# Patient Record
Sex: MALE | Race: BLACK OR AFRICAN AMERICAN | NOT HISPANIC OR LATINO | URBAN - METROPOLITAN AREA
[De-identification: names, ages, dates, MRNs, and addresses within clinical notes are randomized per-mention and may not be internally consistent; named-entity substitution may affect disease eponyms.]

---
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
## 2019-08-27 ENCOUNTER — OFFICE VISIT - HEALTHEAST (OUTPATIENT)
Dept: FAMILY MEDICINE | Facility: CLINIC | Age: 19
End: 2019-08-27

## 2019-08-27 DIAGNOSIS — Z00.00 WELL ADULT EXAM: ICD-10-CM

## 2019-08-27 ASSESSMENT — MIFFLIN-ST. JEOR: SCORE: 1873.08

## 2019-08-28 ENCOUNTER — COMMUNICATION - HEALTHEAST (OUTPATIENT)
Dept: ADMINISTRATIVE | Facility: CLINIC | Age: 19
End: 2019-08-28

## 2019-08-30 ENCOUNTER — COMMUNICATION - HEALTHEAST (OUTPATIENT)
Dept: FAMILY MEDICINE | Facility: CLINIC | Age: 19
End: 2019-08-30

## 2021-05-30 ENCOUNTER — RECORDS - HEALTHEAST (OUTPATIENT)
Dept: ADMINISTRATIVE | Facility: CLINIC | Age: 21
End: 2021-05-30

## 2021-05-31 NOTE — TELEPHONE ENCOUNTER
Please look for this fax today, I will try but can not guarantee that they will get fax to my by then.

## 2021-05-31 NOTE — TELEPHONE ENCOUNTER
Pt discovered after returning from his physical that he will need a different form completed in order to participate in college sports.  He dropped off the form and is hoping Kristin might be able to complete it and fax it before 1:00 pm on Thurs 8/29/19 when he is scheduled to start practice, if possible.  The fax number is on the top of the form and the form is in the mail cart in Kristin Serrato's file.

## 2021-05-31 NOTE — TELEPHONE ENCOUNTER
Who is calling:  Patient  Reason for Call:  Patient was informed of Kristin Serrato PA-C's message below. Patient stated his practice start at 4 PM today and he would like a call back with an update.  Date of last appointment with primary care: 8/27/19  Okay to leave a detailed message: No  [phone]

## 2021-06-01 VITALS — WEIGHT: 186 LBS | BODY MASS INDEX: 24.54 KG/M2

## 2021-06-01 VITALS — HEIGHT: 72 IN | BODY MASS INDEX: 24.38 KG/M2 | WEIGHT: 180 LBS

## 2021-06-03 VITALS — WEIGHT: 183 LBS | HEIGHT: 72 IN | BODY MASS INDEX: 24.79 KG/M2

## 2021-06-16 PROBLEM — R21 RASH: Status: ACTIVE | Noted: 2018-07-23

## 2021-06-19 NOTE — PROGRESS NOTES
Assessment:    1. Rash- with exercise.Possible exercise induced urticaria. Advised to see allergist 7-23-18        Plan: See Patient Instructions.    No medications were ordered this encounter      Patient Instructions     Take pictures of the skin when he has hives.    Then should meet with one of our allergists.     Cards given.    He should have a physical before going to college in [place].        Roomed by: stephen    Accompanied by Father    Refills needed? No    Do you have any forms that need to be filled out? No        Vitals:    07/23/18 0947   BP: 118/68   Pulse: 69   Temp: 98  F (36.7  C)   SpO2: 98%       Chief Complaint   Patient presents with     breaking out     started last summer all over body, on face worse        HPI:    Whenever plays basketball gets lines on forehead  Deep creases on face    Lasts for 20-30 min and goes away    Skin a little itchy at the time   Not red    Skin on arms gets tiny bumps    Not sure if any on his legs    Left shoulder may be red    Once he stops exercising it all goes away    If he works hard for about 20 min he gets the rash.    ROS:      No swallowing or breathing problems when he has the rash.    Allergies: nuts and peanuts   Has some seasonal allergies and takes Zyrtec occasionally         ================================    Physical Exam:    General Appearance:   Alert, NAD   Eyes: clear    Ears:  Right TM:  clear   Left TM:  clear   Nose: clear    Throat:  clear       Neck:   Supple, No significant adenopathy   Lungs:  clear                Cardiac:   S1, S2 nl

## 2021-06-20 NOTE — PROGRESS NOTES
[facility] Well Child Check    ASSESSMENT & PLAN  Tesfaye Miranda is a 17 y.o. who has normal growth and normal development.      History urticarial type rash with exertion.   Patient was to be evaluated by allergy, no appt scheduled.   Leaves for college today.  Reviewed importance , Epipen renewed , renewed proper use , no evidence hives today.      Patient unsure which immunizations are needed for college.  Imm record printed and sent with patient.      No concerns, denies SA today , although we did review importance condom usage       Diagnoses and all orders for this visit:    WC (well child check)  -     Lipid Profile  -     Hearing Screening  -     Vision Screening    Allergy to peanuts  -     EPINEPHrine (EPIPEN/ADRENACLICK/AUVI-Q) 0.3 mg/0.3 mL injection; Inject 0.3 mL (0.3 mg total) as directed as needed for anaphylaxis. Inject into thigh.  Dispense: 4 Pre-filled Pen Syringe; Refill: 0        schedule with allergy     IMMUNIZATIONS/LABS  No immunizations due today.    REFERRALS  Dental:  Recommend routine dental care as appropriate.  Other:  Referrals were made for allergy follow up     ANTICIPATORY GUIDANCE  Social:  Friends, Peer Pressure, Extracurricular Activities and Changes and Choices  Parenting:  Support, Family Time and Confidential Health Care  Nutrition:  Junk Food and Dieting  Play and Communication:  Organized Sports, Appropriate Use of TV, Read Books and Media Violence Awareness  Health:  Smoking, Alcohol, Self Testicular Exam and Sun Screen  Safety:  Seat Belts, Swimming Safety, Contact Sports, Bike/Motorcycle Helmets and Safe storage of Weapons    HEALTH HISTORY  Do you have any concerns that you'd like to discuss today?: No concerns       No question data found.    Do you have any significant health concerns in your family history?: No  Family History   Problem Relation Age of Onset     Arthritis Mother      No Medical Problems Father      Since your last visit, have there been any  major changes in your family, such as a move, job change, separation, divorce, or death in the family?: No  Has a lack of transportation kept you from medical appointments?: No    Home  Who lives in your home?:  Mother, Father  Social History     Social History Narrative     Do you have any concerns about losing your housing?: No  Is your housing safe and comfortable?: Yes  Do you have any trouble with sleep?:  No    Education  What school do you child attend?:  [employer]  What grade are you in?:  [employer]  How do you perform in school (grades, behavior, attention, homework?: Good     Eating  Do you eat regular meals including fruits and vegetables?:  yes  What are you drinking (cow's milk, water, soda, juice, sports drinks, energy drinks, etc)?: water  Have you been worried that you don't have enough food?: No  Do you have concerns about your body or appearance?:  No    Activities  Do you have friends?:  yes  Do you get at least one hour of physical activity per day?:  yes  How many hours a day are you in front of a screen other than for schoolwork (computer, TV, phone)?:  4  What do you do for exercise?:  Basketball  Do you have interest/participate in community activities/volunteers/school sports?:  yes    MENTAL HEALTH SCREENING  PHQ-2 Total Score: 0 (8/23/2018  7:58 AM)  Depression Follow-up Plan: patient follow-up to return when and if necessary (8/23/2018  7:58 AM)  PHQ-9 Total Score: 0 (8/23/2018  7:58 AM)    VISION/HEARING  Vision: Completed. See Results  Hearing:  Completed. See Results     Hearing Screening    125Hz 250Hz 500Hz 1000Hz 2000Hz 3000Hz 4000Hz 6000Hz 8000Hz   Right ear:   35 20 20 20 20 20    Left ear:   30 20 20 20 20 20        TB Risk Assessment:  The patient and/or parent/guardian answer positive to:  patient and/or parent/guardian answer 'no' to all screening TB questions    Dyslipidemia Risk Screening  Have either of your parents or any of your grandparents had a stroke or heart  attack before age 55?: No  Any parents with high cholesterol or currently taking medications to treat?: No     Dental  When was the last time you saw the dentist?: over 12 months ago   Parent/Guardian declines the fluoride varnish application today. Fluoride not applied today.    Patient Active Problem List   Diagnosis     Allergy To Peanuts     Rash- with exercise.Possible exercise induced urticaria. Advised to see allergist 17       Drugs  Does the patient use tobacco/alcohol/drugs?:  no    Safety  Does the patient have any safety concerns (peer or home)?:  no  Does the patient use safety belts, helmets and other safety equipment?:  no    Sex  Have you ever had sex?:  No    MEASUREMENTS  Height:  6' (1.829 m)  Weight: 180 lb (81.6 kg)  BMI: Body mass index is 24.41 kg/(m^2).  Blood Pressure: 118/80  Blood pressure percentiles are 35 % systolic and 83 % diastolic based on the 2017 AAP Clinical Practice Guideline. Blood pressure percentile targets: 90: 136/83, 95: 140/87, 95 + 12 mmH/99. This reading is in the Stage 1 hypertension range (BP >= 130/80).    PHYSICAL EXAM  Vitals: /80 (Patient Site: Right Arm, Patient Position: Sitting, Cuff Size: Adult Regular)  Ht 6' (1.829 m)  Wt 180 lb (81.6 kg)  BMI 24.41 kg/m2  General: Alert, quiet, in no acute distress  Head: Normocephalic/atraumatic   Eyes: PERRL, EOM intact, red reflex present bilaterally  Ears: Ears normally formed and placed, canals patent  Nose: Patent nares; noncongested  Mouth: Pink moist mucous membranes, tonsils plus 2, oropharynx clear without erythema   Neck: Supple, no anomalies  Lungs: Clear to auscultation bilaterally.   CV: Normal S1 & S2 with regular rate and rhythm, no murmur present   Abd: Soft, nontender, nondistended, no masses or hepatosplenomegaly, no rebound or guarding  Back: Spine straight, nontender  : Jose 5, testes descended bilaterally, no swelling or lumps noted, normal male genitalia, no evidence  hernia    MSK: Duck walk without concern, hops and skips without issue   Skin: No rashes or lesions; no jaundice  Neuro:  Normal tone, symmetric reflexes

## 2021-06-27 NOTE — PROGRESS NOTES
Progress Notes by Kristin Serrato PA-C at 8/27/2019  2:40 PM     Author: Kristin Serrato PA-C Service: -- Author Type: Physician Assistant    Filed: 8/27/2019  3:35 PM Encounter Date: 8/27/2019 Status: Signed    : Kristin Serrato PA-C (Physician Assistant)       MALE PREVENTATIVE EXAM    Assessment and Plan:       1. Well adult exam  Advised health diet, 6-8 fruit and vegetables daily and discussed juicing in am with protein powder for breakfast.  Advised regular exercise, discussed regular aerobic exercise and strength training.  Advised not more than 1 alcoholic drinks in any given day.  Caffeine: not more than 2 daily.  Water: 6-8 glasses daily.  Multivitamin with Vitamin D 2000 units and iron for women.       Next follow up:  Return in about 1 year (around 8/27/2020) for With PCP, Recheck.    Immunization Review  Adult Imm Review: Due today, orders placed  non-smoker    I discussed the following with the patient:   Adult Healthy Living: Importance of regular exercise  Healthy nutrition  Supplement use        Subjective:   Chief Complaint: Tesfaye Miranda is an 18 y.o. male here for a preventative health visit.     HPI:  Tesfaye Miranda is seen for a sports physical for basketball in college. He had 3 concussions one in football and two in basket ball, no LOC, no ongoing headaches.     Healthy Habits  Are you taking a daily aspirin? No  Do you typically exercising at least 40 min, 3-4 times per week?  Yes  Do you usually eat at least 4 servings of fruit and vegetables a day, include whole grains and fiber and avoid regularly eating high fat foods? Yes  Have you had an eye exam in the past two years? Yes  Do you see a dentist twice per year? NO  Do you have any concerns regarding sleep? No    Safety Screen  If you own firearms, are they secured in a locked gun cabinet or with trigger locks? The patient does not own any firearms  Do you feel you are safe where you are living?: Yes  (8/27/2019  2:52 PM)  Do you feel you are safe in your relationship(s)?: Yes (8/27/2019  2:52 PM)      ROS: Patient denies fever, chills, sweats, fainting, fatigue, weight change, dizziness, sleep problems, chest pain, palpitations, shortness of breath, wheezing, cough,  sore throat, changes in hearing, ear pain,tinnitus,  disphagia, sore throat, globus, changes in vision, eye pain eye redness, acid reflux, nausea, vomiting, diarrhea, constipation, black or bloody stools,  Dysuria, frequency, urinary incontinence, nocturia, hematuria, back pain,joint pain, bone pain, muscle cramps,edema, weakness, numbness, tingling of extremities, rash, itching, skin changes, swollen lymph nodes, thirst, increased urination, breast lumps, breast pain, nipple discharge, memory difficulties, anxiety, mood swings, (female)vaginal discharge, dyspareunia, menorrhagia, pelvic pain, sexual dysfunction,   (male) testicular lumps, erectile dysfunction.      Cancer Screening     Patient has no health maintenance due at this time          Patient Care Team:  Kristin Serrato PA-C as PCP - General (Physician Assistant)        History     Reviewed By Date/Time Sections Reviewed    Kristin Serrato PA-C 8/27/2019  3:34 PM Lifestyle    Kristin Serrato PA-C 8/27/2019  3:33 PM Tobacco, Alcohol, Drug Use, Sexual Activity, Family    Crissy Mohan CMA 8/27/2019  2:52 PM Tobacco            Objective:   Vital Signs:   Visit Vitals  /62 (Patient Site: Right Arm, Patient Position: Sitting, Cuff Size: Adult Regular)   Pulse 60   Temp 98.1  F (36.7  C) (Oral)   Ht 6' (1.829 m)   Wt 183 lb (83 kg)   SpO2 98%   BMI 24.82 kg/m           PHYSICAL EXAM  PHYSICAL EXAM  Alert, cooperative, well-hydrated.  Appears well.  Eyes: Pupils equal, round, reactive to light.  HEENT: Sclera white, nares patent, MMM, TM's pearly bilaterally, neck supple, no lymph adenopathy, thyroid without organomegaly and freely movable, no nodules or masses.   Lungs:  Clear to auscultation. No retractions, no increased work of respiration, equal chest rise.   Heart: Regular rate and rhythm, no murmurs, clicks,    Gallops.Sitting/StandingiSupine.  Abdomen: Soft, bowel sounds in 4 quadrants with no tenderness to palpation, no organomegaly or masses, no aortic or renal bruits.  Extremities: no tenderness to palpation of gastrocnemius, bilaterally.  Skin: no increased warmth, edema, or erythema of lower legs bilaterally.  Back:  No cervical, thoracic or lumbar tenderness to spinous processes or musculature.    Neuro::pupils equal and reactive to light bilaterally,EOMFI, no nystagmus, no strabismus,  CN II - XII grossly intact. No focal motor/sensory deficits. Rhomberg negative, duck walk and squat test.    Mood: smiles easily, affect full, good eye contact, converses well, no pressured speech, no psychomotor agitation, no suicidal or homicidal ideations.           Medication List           Accurate as of 8/27/19  3:34 PM. If you have any questions, ask your nurse or doctor.               CONTINUE taking these medications    EPINEPHrine 0.3 mg/0.3 mL injection  Also known as:  EPIPEN/ADRENACLICK/AUVI-Q  INSTRUCTIONS:  Inject 0.3 mL (0.3 mg total) as directed as needed for anaphylaxis. Inject into thigh.  Doctor's comments:  Please dispense 2 of the 2 packs.               Additional Screenings Completed Today: